# Patient Record
Sex: MALE | Race: WHITE | NOT HISPANIC OR LATINO | Employment: FULL TIME | URBAN - METROPOLITAN AREA
[De-identification: names, ages, dates, MRNs, and addresses within clinical notes are randomized per-mention and may not be internally consistent; named-entity substitution may affect disease eponyms.]

---

## 2017-02-10 ENCOUNTER — TELEPHONE (OUTPATIENT)
Dept: URGENT CARE | Facility: CLINIC | Age: 27
End: 2017-02-10

## 2017-02-10 ENCOUNTER — OFFICE VISIT (OUTPATIENT)
Dept: URGENT CARE | Facility: CLINIC | Age: 27
End: 2017-02-10

## 2017-02-10 VITALS
TEMPERATURE: 99.3 F | DIASTOLIC BLOOD PRESSURE: 70 MMHG | OXYGEN SATURATION: 98 % | HEART RATE: 84 BPM | SYSTOLIC BLOOD PRESSURE: 122 MMHG | RESPIRATION RATE: 16 BRPM

## 2017-02-10 DIAGNOSIS — J02.9 PHARYNGITIS, UNSPECIFIED ETIOLOGY: ICD-10-CM

## 2017-02-10 LAB
INT CON NEG: NEGATIVE
INT CON POS: POSITIVE
S PYO AG THROAT QL: POSITIVE

## 2017-02-10 PROCEDURE — 87880 STREP A ASSAY W/OPTIC: CPT | Performed by: EMERGENCY MEDICINE

## 2017-02-10 PROCEDURE — 99203 OFFICE O/P NEW LOW 30 MIN: CPT | Performed by: EMERGENCY MEDICINE

## 2017-02-10 RX ORDER — AMOXICILLIN 500 MG/1
500 CAPSULE ORAL 2 TIMES DAILY
Qty: 20 CAP | Refills: 0 | Status: SHIPPED | OUTPATIENT
Start: 2017-02-10 | End: 2024-02-15

## 2017-02-10 RX ORDER — IBUPROFEN 200 MG
200 TABLET ORAL EVERY 6 HOURS PRN
COMMUNITY
End: 2024-02-15

## 2017-02-10 ASSESSMENT — ENCOUNTER SYMPTOMS
VOMITING: 0
HOARSE VOICE: 0
ABDOMINAL PAIN: 0
TROUBLE SWALLOWING: 1
EYE DISCHARGE: 0
CHILLS: 0
FEVER: 0
EYE REDNESS: 0
HEADACHES: 0
COUGH: 0
SORE THROAT: 1
SWOLLEN GLANDS: 1
STRIDOR: 0
SHORTNESS OF BREATH: 0
SENSORY CHANGE: 0
NAUSEA: 0
NECK PAIN: 1
DIARRHEA: 0

## 2017-02-10 NOTE — MR AVS SNAPSHOT
Giovani Powell Singh   2/10/2017 8:45 AM   Office Visit   MRN: [id number]    Department:  [facility]   Dept Phone:  [phone]    Description:  Male : 1990   Provider:  GORDON Gordillo M.D.           Reason for Visit     Pharyngitis x 2 days, sore throat, paint to swallow and little nasal congestion.      Allergies as of 2/10/2017     No Known Allergies      You were diagnosed with     Pharyngitis, unspecified etiology   [[id number]]         Vital Signs     Blood Pressure Pulse Temperature Respirations Oxygen Saturation Smoking Status    122/70 mmHg 84 37.4 °C (99.3 °F) 16 98% Current Every Day Smoker      Basic Information     Date Of Birth Sex Race Ethnicity Preferred Language    1990 Male White Non- English      Health Maintenance        Date Due Completion Dates    IMM DTaP/Tdap/Td Vaccine (1 - Tdap) 2007 ---    IMM INFLUENZA (1) 2016 ---            Results     POCT Rapid Strep A      Component    Rapid Strep Screen    Positive    Internal Control Positive    Positive    Internal Control Negative    Negative                        Current Immunizations     No immunizations on file.      Below and/or attached are the medications your provider expects you to take. Review all of your home medications and newly ordered medications with your provider and/or pharmacist. Follow medication instructions as directed by your provider and/or pharmacist. Please keep your medication list with you and share with your provider. Update the information when medications are discontinued, doses are changed, or new medications (including over-the-counter products) are added; and carry medication information at all times in the event of emergency situations     Allergies:  No Known Allergies          Medications  Valid as of: February 10, 2017 -  9:33 AM    Generic Name Brand Name Tablet Size Instructions for use    Acetaminophen (Tab) TYLENOL 325 MG Take 650 mg by mouth every four hours  as needed.        Amoxicillin (Cap) AMOXIL 500 MG Take 1 Cap by mouth 2 times a day.        DPH-Lido-AlHydr-MgHydr-Simeth (Suspension) DPH-Lido-AlHydr-MgHydr-Simeth  10ml gargle and spit every 2 hours as needed. 1/1/1. May substitute.        Ibuprofen (Tab) MOTRIN 200 MG Take 200 mg by mouth every 6 hours as needed.        .                 Medicines prescribed today were sent to:     [id number] - [place] - [address] [place] [address]    Phone: [phone] Fax: [phone]    Open 24 Hours?: No      Medication refill instructions:       If your prescription bottle indicates you have medication refills left, it is not necessary to call your provider’s office. Please contact your pharmacy and they will refill your medication.    If your prescription bottle indicates you do not have any refills left, you may request refills at any time through one of the following ways: The online Datacastle system (except Urgent Care), by calling your provider’s office, or by asking your pharmacy to contact your provider’s office with a refill request. Medication refills are processed only during regular business hours and may not be available until the next business day. Your provider may request additional information or to have a follow-up visit with you prior to refilling your medication.   *Please Note: Medication refills are assigned a new Rx number when refilled electronically. Your pharmacy may indicate that no refills were authorized even though a new prescription for the same medication is available at the pharmacy. Please request the medicine by name with the pharmacy before contacting your provider for a refill.           Datacastle Access Code: [email or web address]  Expires: 3/12/2017  9:33 AM    Datacastle  A secure, online tool to manage your health information     [employer]’s Datacastle® is a secure, online tool that connects you to your personalized health information from the privacy of your home --  day or night - making it very easy for you to manage your healthcare. Once the activation process is completed, you can even access your medical information using the just.me umer, which is available for free in the Apple Umer store or Google Play store.     just.me provides the following levels of access (as shown below):   My Chart Features   [facility] Doctor [facility]  Specialists [facility]  Urgent  Care Non-[facility]  Primary Care  Doctor   Email your healthcare team securely and privately 24/7 X X X    Manage appointments: schedule your next appointment; view details of past/upcoming appointments X      Request prescription refills. X      View recent personal medical records, including lab and immunizations X X X X   View health record, including health history, allergies, medications X X X X   Read reports about your outpatient visits, procedures, consult and ER notes X X X X   See your discharge summary, which is a recap of your hospital and/or ER visit that includes your diagnosis, lab results, and care plan. X X       How to register for just.me:  1. Go to  [email or web address].  2. Click on the Sign Up Now box, which takes you to the New Member Sign Up page. You will need to provide the following information:  a. Enter your just.me Access Code exactly as it appears at the top of this page. (You will not need to use this code after you’ve completed the sign-up process. If you do not sign up before the expiration date, you must request a new code.)   b. Enter your date of birth.   c. Enter your home email address.   d. Click Submit, and follow the next screen’s instructions.  3. Create a just.me ID. This will be your just.me login ID and cannot be changed, so think of one that is secure and easy to remember.  4. Create a just.me password. You can change your password at any time.  5. Enter your Password Reset Question and Answer. This can be used at a later time if you forget your password.   6. Enter  your e-mail address. This allows you to receive e-mail notifications when new information is available in [employer].  7. Click Sign Up. You can now view your health information.    For assistance activating your [employer] account, call [phone]

## 2017-02-10 NOTE — PROGRESS NOTES
Subjective:      Giovani Winters is a 26 y.o. male who presents with Pharyngitis            Pharyngitis   This is a new problem. Episode onset: patient's been ill for the past 2 days wife was diagnosed with positive strep 4 days ago. Neither side of throat is experiencing more pain than the other. There has been no fever. The pain is moderate. Associated symptoms include congestion, neck pain (anterior cervical chain tenderness.), swollen glands and trouble swallowing. Pertinent negatives include no abdominal pain, coughing, diarrhea, drooling, headaches, hoarse voice, plugged ear sensation, shortness of breath, stridor or vomiting. He has had exposure to strep. He has had no exposure to mono. He has tried nothing for the symptoms.     No Known Allergies   Social History     Social History   • Marital Status: Single     Spouse Name: N/A   • Number of Children: N/A   • Years of Education: N/A     Occupational History   • Not on file.     Social History Main Topics   • Smoking status: Current Every Day Smoker -- 0.50 packs/day   • Smokeless tobacco: Not on file   • Alcohol Use: No   • Drug Use: Not on file   • Sexual Activity: Not on file     Other Topics Concern   • Not on file     Social History Narrative   History reviewed. No pertinent past medical history.   Current Outpatient Prescriptions on File Prior to Visit   Medication Sig Dispense Refill   • acetaminophen (TYLENOL) 325 MG TABS Take 650 mg by mouth every four hours as needed.     • DPH-Lido-AlHydr-MgHydr-Simeth (FIRST-MOUTHWASH BLM) SUSP 10ml gargle and spit every 2 hours as needed. 1/1/1. May substitute. 240 mL 0     No current facility-administered medications on file prior to visit.   History reviewed. No pertinent family history. Patient's wife currently positive for strep on treatment plan  Review of Systems   Constitutional: Negative for fever and chills.   HENT: Positive for congestion, sore throat and trouble swallowing. Negative for drooling  and hoarse voice.    Eyes: Negative for discharge and redness.   Respiratory: Negative for cough, shortness of breath and stridor.    Cardiovascular: Negative for chest pain.   Gastrointestinal: Negative for nausea, vomiting, abdominal pain and diarrhea.   Musculoskeletal: Positive for neck pain (anterior cervical chain tenderness.).   Skin: Negative for itching and rash.   Neurological: Negative for sensory change and headaches.          Objective:     /70 mmHg  Pulse 84  Temp(Src) 37.4 °C (99.3 °F)  Resp 16  SpO2 98%     Physical Exam   Constitutional: He appears well-developed and well-nourished. No distress.   HENT:   Head: Normocephalic and atraumatic.   Right Ear: External ear normal.   Left Ear: External ear normal.   Eyes: Right eye exhibits no discharge. Left eye exhibits no discharge.   Cardiovascular: Normal rate and regular rhythm.    Pulmonary/Chest: Effort normal and breath sounds normal. No stridor. No respiratory distress. He has no wheezes.   Lymphadenopathy:     He has cervical adenopathy.   Skin: Skin is warm and dry. No rash noted. He is not diaphoretic. No erythema.   Psychiatric: He has a normal mood and affect. His behavior is normal.   Vitals reviewed.              Assessment/Plan:     Diagnosis acute strep pharyngitis      I am recommending the patient initiate/ continue hydration efforts including the use of a vaporizer/humidifier/ netti pot. I also recommend the pt, initiate Mucinex, Cepacol lozenges Chloraseptic spray for symptomatic relief. In addition the patient will initiate the prescribed prescription medication/s: Amoxicillin 500 mg twice a day for 10 days.. If the patient's condition exacerbates with worsening dysphagia, shortness of breath, uncontrolled fever, headache or chest pressure he/she will return immediately to the urgent care or go to  the emergency department for further evaluation.   I called the patient to inform you that his rapid strep was positive.  D  John Gordillo

## 2018-05-02 ENCOUNTER — OFFICE VISIT (OUTPATIENT)
Dept: URGENT CARE | Facility: CLINIC | Age: 28
End: 2018-05-02
Payer: COMMERCIAL

## 2018-05-02 VITALS
RESPIRATION RATE: 16 BRPM | DIASTOLIC BLOOD PRESSURE: 62 MMHG | WEIGHT: 216.71 LBS | TEMPERATURE: 99.7 F | BODY MASS INDEX: 31.03 KG/M2 | OXYGEN SATURATION: 98 % | HEIGHT: 70 IN | SYSTOLIC BLOOD PRESSURE: 128 MMHG | HEART RATE: 90 BPM

## 2018-05-02 DIAGNOSIS — J02.0 STREP THROAT: ICD-10-CM

## 2018-05-02 LAB
INT CON NEG: NORMAL
INT CON POS: NORMAL
S PYO AG THROAT QL: POSITIVE

## 2018-05-02 PROCEDURE — 99213 OFFICE O/P EST LOW 20 MIN: CPT | Performed by: PHYSICIAN ASSISTANT

## 2018-05-02 PROCEDURE — 87880 STREP A ASSAY W/OPTIC: CPT | Performed by: PHYSICIAN ASSISTANT

## 2018-05-02 RX ORDER — AMOXICILLIN 500 MG/1
500 CAPSULE ORAL 2 TIMES DAILY
Qty: 20 CAP | Refills: 0 | Status: SHIPPED | OUTPATIENT
Start: 2018-05-02 | End: 2018-05-12

## 2018-05-06 ASSESSMENT — ENCOUNTER SYMPTOMS
TROUBLE SWALLOWING: 1
SWOLLEN GLANDS: 1
COUGH: 0
DIARRHEA: 0
ABDOMINAL PAIN: 0
HOARSE VOICE: 0
STRIDOR: 0
NECK PAIN: 0

## 2018-05-06 NOTE — PROGRESS NOTES
"Subjective:      Giovani Winters is a 27 y.o. male who presents with Pharyngitis (xtoday, sore throat, chills, fever, body aches)            Pharyngitis    This is a new problem. The current episode started yesterday. The problem has been unchanged. Neither side of throat is experiencing more pain than the other. The maximum temperature recorded prior to his arrival was 100.4 - 100.9 F. The fever has been present for less than 1 day. The pain is at a severity of 3/10. The pain is mild. Associated symptoms include swollen glands and trouble swallowing. Pertinent negatives include no abdominal pain, coughing, diarrhea, ear pain, hoarse voice, neck pain or stridor. He has had exposure to strep. He has tried nothing for the symptoms. The treatment provided no relief.       Review of Systems   HENT: Positive for trouble swallowing. Negative for ear pain and hoarse voice.    Respiratory: Negative for cough and stridor.    Gastrointestinal: Negative for abdominal pain and diarrhea.   Musculoskeletal: Negative for neck pain.          Objective:     /62   Pulse 90   Temp 37.6 °C (99.7 °F)   Resp 16   Ht 1.778 m (5' 10\")   Wt 98.3 kg (216 lb 11.4 oz)   SpO2 98%   BMI 31.09 kg/m²      Physical Exam       Gen.: Patient is A and O ×3, no acute distress, well-nourished well-hydrated  Vitals: Are listed and unremarkable  HEENT: Heads normocephalic, atraumatic, PERRLA, extraocular movements intact, TMs clear and oropharynx shows 2+ enlarged tonsils that are erythematous.  Neck: Soft supple with anterior cervical lymphadenopathy  Cardiovascular: Regular rate and rhythm normal S1 and S2. No murmurs, rubs or gallops  Lungs are clear to auscultation bilaterally. no wheezes rales or rhonchi  Abdomen is soft, nontender, nondistended with good bowel sounds, no hepatosplenomegaly  Skin: Is well perfused without evidence of rash or lesions  Neurological:  cranial nerves II through XII were assessed and " intact.  Musculoskeletal: Full range of motion, 5 out of 5 strength against resistance  Neurovascularly: Intact with a 2 second cap refill, good distal pulses     urgent care course: Rapid strep was positive       Assessment/Plan:     1. Strep throat    - POCT Rapid Strep A  - amoxicillin (AMOXIL) 500 MG Cap; Take 1 Cap by mouth 2 times a day for 10 days.  Dispense: 20 Cap; Refill: 0

## 2023-05-03 ENCOUNTER — OFFICE VISIT (OUTPATIENT)
Dept: URGENT CARE | Facility: CLINIC | Age: 33
End: 2023-05-03
Payer: COMMERCIAL

## 2023-05-03 VITALS
BODY MASS INDEX: 30.06 KG/M2 | HEART RATE: 68 BPM | HEIGHT: 70 IN | TEMPERATURE: 97.6 F | OXYGEN SATURATION: 97 % | RESPIRATION RATE: 16 BRPM | WEIGHT: 210 LBS | SYSTOLIC BLOOD PRESSURE: 130 MMHG | DIASTOLIC BLOOD PRESSURE: 60 MMHG

## 2023-05-03 DIAGNOSIS — J02.9 SORE THROAT: ICD-10-CM

## 2023-05-03 DIAGNOSIS — B96.89 ACUTE BACTERIAL RHINOSINUSITIS: ICD-10-CM

## 2023-05-03 DIAGNOSIS — J01.90 ACUTE BACTERIAL RHINOSINUSITIS: ICD-10-CM

## 2023-05-03 LAB — S PYO DNA SPEC NAA+PROBE: NOT DETECTED

## 2023-05-03 PROCEDURE — 87651 STREP A DNA AMP PROBE: CPT | Performed by: STUDENT IN AN ORGANIZED HEALTH CARE EDUCATION/TRAINING PROGRAM

## 2023-05-03 PROCEDURE — 99203 OFFICE O/P NEW LOW 30 MIN: CPT | Performed by: STUDENT IN AN ORGANIZED HEALTH CARE EDUCATION/TRAINING PROGRAM

## 2023-05-03 RX ORDER — CETIRIZINE HYDROCHLORIDE 10 MG/1
10 TABLET ORAL DAILY
Qty: 30 TABLET | Refills: 1 | Status: SHIPPED | OUTPATIENT
Start: 2023-05-03

## 2023-05-03 RX ORDER — AMOXICILLIN AND CLAVULANATE POTASSIUM 875; 125 MG/1; MG/1
1 TABLET, FILM COATED ORAL 2 TIMES DAILY
Qty: 10 TABLET | Refills: 0 | Status: SHIPPED | OUTPATIENT
Start: 2023-05-03 | End: 2023-05-08

## 2023-05-03 RX ORDER — FLUTICASONE PROPIONATE 50 MCG
2 SPRAY, SUSPENSION (ML) NASAL
Qty: 16 G | Refills: 1 | Status: SHIPPED | OUTPATIENT
Start: 2023-05-03

## 2023-05-03 NOTE — PROGRESS NOTES
"Subjective:   CHIEF COMPLAINT  Chief Complaint   Patient presents with    Pharyngitis    Nasal Congestion    Sinus Problem     X7 days       HPI  Giovani Winters is a 32 y.o. male who presents with a chief complaint of runny nose, sinus congestion, sore throat and cough x1 week.  Said symptoms improved however returned over the weekend and now worsening.  He has been using DayQuil and Tylenol which provided limited relief of symptoms.  Positive ROS for night sweats and tactile fever during initial onset of symptoms.  Denies experiencing wheezing or shortness of breath.  No history of asthma.    REVIEW OF SYSTEMS  General: Admits chills.  GI: no nausea or vomiting  See HPI for further details.    PAST MEDICAL HISTORY  There are no problems to display for this patient.      SURGICAL HISTORY  patient denies any surgical history    ALLERGIES  No Known Allergies    CURRENT MEDICATIONS  Home Medications       Reviewed by Bruce Dia D.O. (Physician) on 05/03/23 at 1001  Med List Status: <None>     Medication Last Dose Status   acetaminophen (TYLENOL) 325 MG TABS Taking Active   amoxicillin (AMOXIL) 500 MG Cap Taking Active   DPH-Lido-AlHydr-MgHydr-Simeth (FIRST-MOUTHWASH BLM) SUSP Taking Active   ibuprofen (MOTRIN) 200 MG Tab Taking Active                    SOCIAL HISTORY  Social History     Tobacco Use    Smoking status: Former     Packs/day: 0.50     Types: Cigarettes    Smokeless tobacco: Current   Substance and Sexual Activity    Alcohol use: No    Drug use: Not on file    Sexual activity: Not on file       FAMILY HISTORY  No family history on file.       Objective:   PHYSICAL EXAM  VITAL SIGNS: /60 (BP Location: Left arm, Patient Position: Sitting, BP Cuff Size: Adult)   Pulse 68   Temp 36.4 °C (97.6 °F) (Temporal)   Resp 16   Ht 1.778 m (5' 10\")   Wt 95.3 kg (210 lb)   SpO2 97%   BMI 30.13 kg/m²     Gen: no acute distress, normal voice  Skin: dry, intact, moist mucosal membranes  Eyes: No " conjunctival injection b/l  Neck: Normal range of motion. No meningeal signs.   ENT: Mild posterior oropharyngeal erythema with significant PND.  No exudates.  Uvula midline.  TMs clear and intact bilaterally Bulging, erythema or effusion.  Lungs: No increased work of breathing.  CTAB w/ symmetric expansion  CV: RRR w/o murmurs or clicks  Psych: normal affect, normal judgement, alert, awake    POC strep: Negative    Assessment/Plan:     1. Acute bacterial rhinosinusitis  amoxicillin-clavulanate (AUGMENTIN) 875-125 MG Tab    fluticasone (FLONASE) 50 MCG/ACT nasal spray    cetirizine (ZYRTEC) 10 MG Tab      2. Sore throat  POCT GROUP A STREP, PCR      Second sickening consistent with a bacterial sinus infection.  -Ordered Augmentin  -Ordered Flonase and Zyrtec  - Return to urgent care any new/worsening symptoms or further questions or concerns.  Patient understood everything discussed.  All questions were answered.      Differential diagnosis, natural history, supportive care, and indications for immediate follow-up discussed. All questions answered. Patient agrees with the plan of care.    Follow-up as needed if symptoms worsen or fail to improve to PCP, Urgent care or Emergency Room.    Please note that this dictation was created using voice recognition software. I have made a reasonable attempt to correct obvious errors, but I expect that there are errors of grammar and possibly content that I did not discover before finalizing the note.

## 2024-02-15 ENCOUNTER — OFFICE VISIT (OUTPATIENT)
Dept: URGENT CARE | Facility: CLINIC | Age: 34
End: 2024-02-15
Payer: COMMERCIAL

## 2024-02-15 VITALS
SYSTOLIC BLOOD PRESSURE: 118 MMHG | DIASTOLIC BLOOD PRESSURE: 62 MMHG | OXYGEN SATURATION: 96 % | RESPIRATION RATE: 18 BRPM | HEIGHT: 70 IN | HEART RATE: 96 BPM | TEMPERATURE: 98.8 F | WEIGHT: 222 LBS | BODY MASS INDEX: 31.78 KG/M2

## 2024-02-15 DIAGNOSIS — B96.89 BACTERIAL SINUSITIS: ICD-10-CM

## 2024-02-15 DIAGNOSIS — J32.9 BACTERIAL SINUSITIS: ICD-10-CM

## 2024-02-15 PROCEDURE — 99213 OFFICE O/P EST LOW 20 MIN: CPT

## 2024-02-15 PROCEDURE — 3074F SYST BP LT 130 MM HG: CPT

## 2024-02-15 PROCEDURE — 3078F DIAST BP <80 MM HG: CPT

## 2024-02-15 RX ORDER — AMOXICILLIN AND CLAVULANATE POTASSIUM 875; 125 MG/1; MG/1
1 TABLET, FILM COATED ORAL 2 TIMES DAILY
Qty: 14 TABLET | Refills: 0 | Status: SHIPPED | OUTPATIENT
Start: 2024-02-15 | End: 2024-02-22

## 2024-02-15 ASSESSMENT — ENCOUNTER SYMPTOMS
COUGH: 1
FEVER: 1
MYALGIAS: 1
HEADACHES: 1

## 2024-02-15 NOTE — PROGRESS NOTES
Subjective:     CHIEF COMPLAINT  Chief Complaint   Patient presents with    Cough     Cough , congestion , fevers x 1 week        HPI  Giovani Winters is a very pleasant 33 y.o. male who presents with 10 days of nasal congestion, a cough, intermittent fevers, fatigue, and body aches.  His nasal congestion worsened on Tuesday of this week and has become colored.  He feels that his symptoms are progressively worsening.  He has also been experiencing bilateral ear discomfort and plugged sensation.    REVIEW OF SYSTEMS  Review of Systems   Constitutional:  Positive for fever and malaise/fatigue.   HENT:  Positive for congestion and ear pain.    Respiratory:  Positive for cough.    Musculoskeletal:  Positive for myalgias.   Neurological:  Positive for headaches.       PAST MEDICAL HISTORY  There are no problems to display for this patient.      SURGICAL HISTORY  patient denies any surgical history    ALLERGIES  No Known Allergies    CURRENT MEDICATIONS  Home Medications       Reviewed by Zaina Bowden P.A.-C. (Physician Assistant) on 02/15/24 at 0929  Med List Status: <None>     Medication Last Dose Status   acetaminophen (TYLENOL) 325 MG TABS Not Taking Active   amoxicillin (AMOXIL) 500 MG Cap Not Taking Active   cetirizine (ZYRTEC) 10 MG Tab Not Taking Active   DPH-Lido-AlHydr-MgHydr-Simeth (FIRST-MOUTHWASH BLM) SUSP Not Taking Active   fluticasone (FLONASE) 50 MCG/ACT nasal spray Not Taking Active   ibuprofen (MOTRIN) 200 MG Tab Not Taking Active                    SOCIAL HISTORY  Social History     Tobacco Use    Smoking status: Former     Current packs/day: 0.50     Types: Cigarettes    Smokeless tobacco: Current   Substance and Sexual Activity    Alcohol use: No    Drug use: Not on file    Sexual activity: Not on file       FAMILY HISTORY  History reviewed. No pertinent family history.       Objective:     VITAL SIGNS: /62 (BP Location: Left arm, Patient Position: Sitting, BP Cuff Size: Large  "adult)   Pulse 96   Temp 37.1 °C (98.8 °F) (Temporal)   Resp 18   Ht 1.778 m (5' 10\")   Wt 101 kg (222 lb)   SpO2 96%   BMI 31.85 kg/m²     PHYSICAL EXAM  Physical Exam  Vitals reviewed.   Constitutional:       General: He is not in acute distress.     Appearance: Normal appearance. He is ill-appearing. He is not toxic-appearing or diaphoretic.   HENT:      Head: Normocephalic and atraumatic.      Right Ear: Tympanic membrane, ear canal and external ear normal.      Left Ear: Tympanic membrane, ear canal and external ear normal.      Nose: Congestion present.      Mouth/Throat:      Mouth: Mucous membranes are moist.      Pharynx: Posterior oropharyngeal erythema present. No oropharyngeal exudate.   Eyes:      Conjunctiva/sclera: Conjunctivae normal.   Cardiovascular:      Rate and Rhythm: Normal rate and regular rhythm.      Heart sounds: Normal heart sounds.   Pulmonary:      Effort: Pulmonary effort is normal. No respiratory distress.      Breath sounds: Normal breath sounds. No stridor. No wheezing, rhonchi or rales.   Lymphadenopathy:      Cervical: Cervical adenopathy present.   Skin:     General: Skin is warm and dry.   Neurological:      General: No focal deficit present.      Mental Status: He is alert.   Psychiatric:         Mood and Affect: Mood normal.       Assessment/Plan:     1. Bacterial sinusitis  - amoxicillin-clavulanate (AUGMENTIN) 875-125 MG Tab; Take 1 Tablet by mouth 2 times a day for 7 days.  Dispense: 14 Tablet; Refill: 0  -Tylenol/ibuprofen over-the-counter as needed for discomfort  -Warm salt water gargles  -Return to clinic if symptoms worsen or fail to resolve    MDM/Comments:  Patient has stable vital signs and is non-toxic appearing. Discussed supportive care with hydration, rest, Tylenol/Ibuprofen as needed.  Recommend use of Flonase for congestion and possible allergy component of symptoms.  Discussed use of saline nasal flushes using distilled water.  Discussed that sinus " infections are typically viral, yet given duration of symptoms I suspect a bacterial origin of symptoms in this case.  Antibiotic treatment started.  Patient demonstrated understanding of treatment plan at this time and will RTC if symptoms worsen or fail to resolve.     Differential diagnosis, natural history, supportive care, and indications for immediate follow-up discussed. All questions answered. Patient agrees with the plan of care.    Follow-up as needed if symptoms worsen or fail to improve to PCP, Urgent care or Emergency Room.    I have personally reviewed prior external notes and test results pertinent to today's visit.  I have independently reviewed and interpreted all diagnostics ordered during this urgent care acute visit.   Discussed management options (risks,benefits, and alternatives to treatment). Pt expresses understanding and the treatment plan was agreed upon. Questions were encouraged and answered to pt's satisfaction.    Please note that this dictation was created using voice recognition software. I have made a reasonable attempt to correct obvious errors, but I expect that there are errors of grammar and possibly content that I did not discover before finalizing the note.

## 2024-05-10 ENCOUNTER — OFFICE VISIT (OUTPATIENT)
Dept: URGENT CARE | Facility: CLINIC | Age: 34
End: 2024-05-10
Payer: COMMERCIAL

## 2024-05-10 VITALS
HEART RATE: 82 BPM | WEIGHT: 230 LBS | SYSTOLIC BLOOD PRESSURE: 124 MMHG | TEMPERATURE: 98.5 F | DIASTOLIC BLOOD PRESSURE: 76 MMHG | BODY MASS INDEX: 32.93 KG/M2 | HEIGHT: 70 IN | RESPIRATION RATE: 16 BRPM | OXYGEN SATURATION: 99 %

## 2024-05-10 DIAGNOSIS — B96.89 BACTERIAL SINUSITIS: ICD-10-CM

## 2024-05-10 DIAGNOSIS — J32.9 BACTERIAL SINUSITIS: ICD-10-CM

## 2024-05-10 PROCEDURE — 3074F SYST BP LT 130 MM HG: CPT

## 2024-05-10 PROCEDURE — 99213 OFFICE O/P EST LOW 20 MIN: CPT

## 2024-05-10 PROCEDURE — 3078F DIAST BP <80 MM HG: CPT

## 2024-05-10 RX ORDER — FLUTICASONE PROPIONATE 50 MCG
2 SPRAY, SUSPENSION (ML) NASAL DAILY
Qty: 16 G | Refills: 0 | Status: SHIPPED | OUTPATIENT
Start: 2024-05-10

## 2024-05-10 RX ORDER — AMOXICILLIN AND CLAVULANATE POTASSIUM 875; 125 MG/1; MG/1
1 TABLET, FILM COATED ORAL 2 TIMES DAILY
Qty: 14 TABLET | Refills: 0 | Status: SHIPPED | OUTPATIENT
Start: 2024-05-10 | End: 2024-05-17

## 2024-05-10 RX ORDER — BENZONATATE 100 MG/1
100 CAPSULE ORAL 3 TIMES DAILY PRN
Qty: 60 CAPSULE | Refills: 0 | Status: SHIPPED | OUTPATIENT
Start: 2024-05-10

## 2024-05-10 ASSESSMENT — ENCOUNTER SYMPTOMS
COUGH: 1
FEVER: 0
SPUTUM PRODUCTION: 1

## 2024-05-10 NOTE — PROGRESS NOTES
"Subjective:     CHIEF COMPLAINT  Chief Complaint   Patient presents with    Congestion     X1wk, Congestion, cough, colored mucus       HPI  Giovani Winters is a very pleasant 33 y.o. male who presents with nasal congestion with colored mucus for the past week.  He has also developed a raspy cough and chest congestion.  He reports that he had fevers at the onset of his symptoms which have resolved at this time.  He has tried over-the-counter medications with temporary improvement in symptoms.  He feels that his symptoms are progressively worsening.    REVIEW OF SYSTEMS  Review of Systems   Constitutional:  Negative for fever.   HENT:  Positive for congestion.    Respiratory:  Positive for cough and sputum production.        PAST MEDICAL HISTORY  There are no problems to display for this patient.      SURGICAL HISTORY  patient denies any surgical history    ALLERGIES  No Known Allergies    CURRENT MEDICATIONS  Home Medications       Reviewed by Zaina Bowden P.A.-C. (Physician Assistant) on 05/10/24 at 1620  Med List Status: <None>     Medication Last Dose Status   cetirizine (ZYRTEC) 10 MG Tab Not Taking Active   fluticasone (FLONASE) 50 MCG/ACT nasal spray Not Taking Active                    SOCIAL HISTORY  Social History     Tobacco Use    Smoking status: Former     Current packs/day: 0.50     Types: Cigarettes    Smokeless tobacco: Current   Substance and Sexual Activity    Alcohol use: No    Drug use: Not on file    Sexual activity: Not on file       FAMILY HISTORY  History reviewed. No pertinent family history.       Objective:     VITAL SIGNS: /76 (BP Location: Left arm, Patient Position: Sitting, BP Cuff Size: Adult)   Pulse 82   Temp 36.9 °C (98.5 °F) (Temporal)   Resp 16   Ht 1.778 m (5' 10\")   Wt 104 kg (230 lb)   SpO2 99%   BMI 33.00 kg/m²     PHYSICAL EXAM  Physical Exam  Vitals reviewed.   Constitutional:       General: He is not in acute distress.     Appearance: Normal " appearance. He is not ill-appearing or toxic-appearing.   HENT:      Head: Normocephalic and atraumatic.      Right Ear: Tympanic membrane, ear canal and external ear normal.      Left Ear: Tympanic membrane, ear canal and external ear normal.      Nose: Congestion present.      Mouth/Throat:      Mouth: Mucous membranes are moist.      Pharynx: Posterior oropharyngeal erythema present. No oropharyngeal exudate.      Comments: Uvula midline  Eyes:      Conjunctiva/sclera: Conjunctivae normal.      Pupils: Pupils are equal, round, and reactive to light.   Cardiovascular:      Rate and Rhythm: Normal rate and regular rhythm.      Heart sounds: Normal heart sounds.   Pulmonary:      Effort: Pulmonary effort is normal. No respiratory distress.      Breath sounds: Normal breath sounds. No stridor. No wheezing, rhonchi or rales.   Skin:     General: Skin is warm and dry.      Coloration: Skin is not pale.   Neurological:      General: No focal deficit present.      Mental Status: He is alert and oriented to person, place, and time.   Psychiatric:         Mood and Affect: Mood normal.         Assessment/Plan:     1. Bacterial sinusitis  - fluticasone (FLONASE) 50 MCG/ACT nasal spray; Administer 2 Sprays into affected nostril(S) every day.  Dispense: 16 g; Refill: 0  - amoxicillin-clavulanate (AUGMENTIN) 875-125 MG Tab; Take 1 Tablet by mouth 2 times a day for 7 days.  Dispense: 14 Tablet; Refill: 0  - benzonatate (TESSALON) 100 MG Cap; Take 1 Capsule by mouth 3 times a day as needed for Cough.  Dispense: 60 Capsule; Refill: 0  -Sinus flushes using distilled water and saline packet  -Return to clinic if symptoms worsen or fail to resolve    MDM/Comments:  Patient has stable vital signs and is non-toxic appearing. Discussed supportive care with hydration, rest, Tylenol/Ibuprofen as needed.  Recommend use of Flonase for congestion and possible allergy component of symptoms.  Discussed use of saline nasal flushes using  distilled water.  Discussed that sinus infections are typically viral, yet given duration of symptoms I suspect a bacterial origin of symptoms in this case.  Antibiotic treatment started.  Patient demonstrated understanding of treatment plan at this time and will RTC if symptoms worsen or fail to resolve.     Differential diagnosis, natural history, supportive care, and indications for immediate follow-up discussed. All questions answered. Patient agrees with the plan of care.    Follow-up as needed if symptoms worsen or fail to improve to PCP, Urgent care or Emergency Room.    I have personally reviewed prior external notes and test results pertinent to today's visit.  I have independently reviewed and interpreted all diagnostics ordered during this urgent care acute visit.   Discussed management options (risks,benefits, and alternatives to treatment). Pt expresses understanding and the treatment plan was agreed upon. Questions were encouraged and answered to pt's satisfaction.    Please note that this dictation was created using voice recognition software. I have made a reasonable attempt to correct obvious errors, but I expect that there are errors of grammar and possibly content that I did not discover before finalizing the note.